# Patient Record
Sex: MALE
[De-identification: names, ages, dates, MRNs, and addresses within clinical notes are randomized per-mention and may not be internally consistent; named-entity substitution may affect disease eponyms.]

---

## 2022-05-05 ENCOUNTER — HOSPITAL ENCOUNTER (INPATIENT)
Dept: HOSPITAL 12 - ER | Age: 47
LOS: 4 days | Discharge: HOME | DRG: 645 | End: 2022-05-09
Payer: COMMERCIAL

## 2022-05-05 VITALS — BODY MASS INDEX: 35 KG/M2 | HEIGHT: 71 IN | WEIGHT: 250 LBS

## 2022-05-05 DIAGNOSIS — E66.01: ICD-10-CM

## 2022-05-05 DIAGNOSIS — E83.42: ICD-10-CM

## 2022-05-05 DIAGNOSIS — Z20.822: ICD-10-CM

## 2022-05-05 DIAGNOSIS — T38.895A: ICD-10-CM

## 2022-05-05 DIAGNOSIS — E23.2: ICD-10-CM

## 2022-05-05 DIAGNOSIS — E11.9: ICD-10-CM

## 2022-05-05 DIAGNOSIS — G40.909: ICD-10-CM
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
## 2022-05-05 DIAGNOSIS — E22.2: Primary | ICD-10-CM

## 2022-05-05 LAB
ALP SERPL-CCNC: 69 U/L (ref 50–136)
ALT SERPL W/O P-5'-P-CCNC: 31 U/L (ref 16–63)
AST SERPL-CCNC: 21 U/L (ref 15–37)
BILIRUB DIRECT SERPL-MCNC: 0.2 MG/DL (ref 0–0.2)
BILIRUB SERPL-MCNC: 0.7 MG/DL (ref 0.2–1)
BUN SERPL-MCNC: 10 MG/DL (ref 7–18)
CHLORIDE SERPL-SCNC: 84 MMOL/L (ref 98–107)
CO2 SERPL-SCNC: 25 MMOL/L (ref 21–32)
CREAT SERPL-MCNC: 0.9 MG/DL (ref 0.6–1.3)
GLUCOSE SERPL-MCNC: 95 MG/DL (ref 74–106)
HCT VFR BLD AUTO: 38.9 % (ref 36.7–47.1)
MCH RBC QN AUTO: 29 UUG (ref 23.8–33.4)
MCV RBC AUTO: 78.5 FL (ref 73–96.2)
PLATELET # BLD AUTO: 231 K/UL (ref 152–348)
POTASSIUM SERPL-SCNC: 4 MMOL/L (ref 3.5–5.1)
WS STN SPEC: 6.9 G/DL (ref 6.4–8.2)

## 2022-05-05 PROCEDURE — A4663 DIALYSIS BLOOD PRESSURE CUFF: HCPCS

## 2022-05-05 PROCEDURE — G0378 HOSPITAL OBSERVATION PER HR: HCPCS

## 2022-05-05 RX ADMIN — MAGNESIUM SULFATE IN DEXTROSE SCH MLS/HR: 10 INJECTION, SOLUTION INTRAVENOUS at 21:00

## 2022-05-05 RX ADMIN — MAGNESIUM SULFATE IN DEXTROSE SCH MLS/HR: 10 INJECTION, SOLUTION INTRAVENOUS at 21:27

## 2022-05-05 RX ADMIN — MAGNESIUM SULFATE IN DEXTROSE SCH MLS/HR: 10 INJECTION, SOLUTION INTRAVENOUS at 20:16

## 2022-05-05 RX ADMIN — MAGNESIUM SULFATE IN DEXTROSE SCH MLS/HR: 10 INJECTION, SOLUTION INTRAVENOUS at 20:42

## 2022-05-05 NOTE — NUR
Patient BIB [facility] c/o seizure x2 today. Patient has a hx of epilepsy. Last 
seizure episode was April 2019. Patient is A/Ox4, no SOB noted. Blood sugar was 
92 per report

## 2022-05-06 VITALS — SYSTOLIC BLOOD PRESSURE: 132 MMHG | DIASTOLIC BLOOD PRESSURE: 76 MMHG

## 2022-05-06 VITALS — DIASTOLIC BLOOD PRESSURE: 71 MMHG | SYSTOLIC BLOOD PRESSURE: 11 MMHG

## 2022-05-06 VITALS — DIASTOLIC BLOOD PRESSURE: 72 MMHG | SYSTOLIC BLOOD PRESSURE: 125 MMHG

## 2022-05-06 VITALS — DIASTOLIC BLOOD PRESSURE: 80 MMHG | SYSTOLIC BLOOD PRESSURE: 188 MMHG

## 2022-05-06 VITALS — SYSTOLIC BLOOD PRESSURE: 120 MMHG | DIASTOLIC BLOOD PRESSURE: 67 MMHG

## 2022-05-06 VITALS — DIASTOLIC BLOOD PRESSURE: 79 MMHG | SYSTOLIC BLOOD PRESSURE: 132 MMHG

## 2022-05-06 VITALS — DIASTOLIC BLOOD PRESSURE: 70 MMHG | SYSTOLIC BLOOD PRESSURE: 147 MMHG

## 2022-05-06 LAB
ALP SERPL-CCNC: 59 U/L (ref 50–136)
ALT SERPL W/O P-5'-P-CCNC: 42 U/L (ref 16–63)
AST SERPL-CCNC: 77 U/L (ref 15–37)
BILIRUB SERPL-MCNC: 0.7 MG/DL (ref 0.2–1)
BUN SERPL-MCNC: 10 MG/DL (ref 7–18)
BUN SERPL-MCNC: 10 MG/DL (ref 7–18)
CHLORIDE SERPL-SCNC: 87 MMOL/L (ref 98–107)
CHLORIDE SERPL-SCNC: 89 MMOL/L (ref 98–107)
CHLORIDE UR-SCNC: 121 MMOL/L (ref 100–250)
CHOLEST SERPL-MCNC: 126 MG/DL (ref ?–200)
CO2 SERPL-SCNC: 26 MMOL/L (ref 21–32)
CO2 SERPL-SCNC: 27 MMOL/L (ref 21–32)
CREAT SERPL-MCNC: 0.8 MG/DL (ref 0.6–1.3)
CREAT SERPL-MCNC: 0.9 MG/DL (ref 0.6–1.3)
GLUCOSE SERPL-MCNC: 87 MG/DL (ref 74–106)
GLUCOSE SERPL-MCNC: 93 MG/DL (ref 74–106)
HCT VFR BLD AUTO: 36.5 % (ref 36.7–47.1)
HDLC SERPL-MCNC: 52 MG/DL (ref 40–60)
MAGNESIUM SERPL-MCNC: 1.9 MG/DL (ref 1.8–2.4)
MCH RBC QN AUTO: 29.5 UUG (ref 23.8–33.4)
MCV RBC AUTO: 78.1 FL (ref 73–96.2)
PHOSPHATE SERPL-MCNC: 3.1 MG/DL (ref 2.5–4.9)
PLATELET # BLD AUTO: 223 K/UL (ref 152–348)
POTASSIUM SERPL-SCNC: 3.7 MMOL/L (ref 3.5–5.1)
POTASSIUM SERPL-SCNC: 3.8 MMOL/L (ref 3.5–5.1)
POTASSIUM UR-SCNC: 22 MMOL/L (ref 25–125)
TRIGL SERPL-MCNC: 72 MG/DL (ref 30–150)
TSH SERPL DL<=0.005 MIU/L-ACNC: 1.7 MIU/ML (ref 0.36–3.74)
WS STN SPEC: 6.2 G/DL (ref 6.4–8.2)

## 2022-05-06 RX ADMIN — ACETAMINOPHEN PRN MG: 325 TABLET ORAL at 08:26

## 2022-05-06 RX ADMIN — ACETAMINOPHEN PRN MG: 325 TABLET ORAL at 19:07

## 2022-05-06 RX ADMIN — ACETAMINOPHEN PRN MG: 325 TABLET ORAL at 00:43

## 2022-05-06 RX ADMIN — PANTOPRAZOLE SODIUM SCH MG: 40 TABLET, DELAYED RELEASE ORAL at 06:12

## 2022-05-06 NOTE — NUR
Received patient in bed with wife at bedside. AAOX4. Sinus Rhythm on tele with HR of 74bpm. 
IV access patent and intact running 3% at 35cc/hr. Seizure Precautions in placed. Will 
continue to monitor. 

-------------------------------------------------------------------------------

Addendum: 05/07/22 at 2104 by SHARON BARTHOLOMEW RN

-------------------------------------------------------------------------------

running 3% Nacl at 35cc/hr.

## 2022-05-06 NOTE — NUR
Patient's neurologist Dr. Massey had requested for a Levetiracetam level draw, since normal 
dose of Keppra was increased from 500mg to 1500mg. Dr. Avalos notified and agreed to put in 
the order.

## 2022-05-06 NOTE — NUR
neurologist informed of seizure episode with orders to adjust dose of Keppra. Will continue 
to monitor patient.

## 2022-05-06 NOTE — NUR
PATIENT ALERT AND ORIENTED X4. EATING DINNER. NOT IN DISTRESS.  TELE SR 75. SODIUM 3% AT 
35ML PER HOUR INFUSING. SEIZURE PRECAUTIONS REINFORCED.

## 2022-05-06 NOTE — NUR
PATIENT HAD SEIZURE AT 1535 THAT LASTED 35-45 SECONDS. LORAZEPAM 1 MG GIVEN IV PUSH. DR MAYRA GARCIA.

## 2022-05-07 VITALS — DIASTOLIC BLOOD PRESSURE: 72 MMHG | SYSTOLIC BLOOD PRESSURE: 129 MMHG

## 2022-05-07 VITALS — DIASTOLIC BLOOD PRESSURE: 85 MMHG | SYSTOLIC BLOOD PRESSURE: 133 MMHG

## 2022-05-07 VITALS — DIASTOLIC BLOOD PRESSURE: 68 MMHG | SYSTOLIC BLOOD PRESSURE: 115 MMHG

## 2022-05-07 VITALS — SYSTOLIC BLOOD PRESSURE: 135 MMHG | DIASTOLIC BLOOD PRESSURE: 69 MMHG

## 2022-05-07 VITALS — SYSTOLIC BLOOD PRESSURE: 123 MMHG | DIASTOLIC BLOOD PRESSURE: 73 MMHG

## 2022-05-07 LAB
BUN SERPL-MCNC: 8 MG/DL (ref 7–18)
CHLORIDE SERPL-SCNC: 89 MMOL/L (ref 98–107)
CO2 SERPL-SCNC: 28 MMOL/L (ref 21–32)
CREAT SERPL-MCNC: 0.9 MG/DL (ref 0.6–1.3)
GLUCOSE SERPL-MCNC: 88 MG/DL (ref 74–106)
HCT VFR BLD AUTO: 38 % (ref 36.7–47.1)
MAGNESIUM SERPL-MCNC: 1.8 MG/DL (ref 1.8–2.4)
MCH RBC QN AUTO: 29.2 UUG (ref 23.8–33.4)
MCV RBC AUTO: 79.8 FL (ref 73–96.2)
PHOSPHATE SERPL-MCNC: 2.1 MG/DL (ref 2.5–4.9)
PLATELET # BLD AUTO: 207 K/UL (ref 152–348)
POTASSIUM SERPL-SCNC: 4.4 MMOL/L (ref 3.5–5.1)
TSH SERPL DL<=0.005 MIU/L-ACNC: 1.98 MIU/ML (ref 0.36–3.74)
URATE SERPL-MCNC: 4.3 MG/DL (ref 3.5–7.2)

## 2022-05-07 RX ADMIN — PANTOPRAZOLE SODIUM SCH MG: 40 TABLET, DELAYED RELEASE ORAL at 06:26

## 2022-05-07 NOTE — NUR
NO SEIZURE ACTIVITIES AT THIS TIME REMAIN ON 3 PERCENT SALINE AS ORDERED PHOS LEVEL IS 2.1 
WITH NEUTROPHOS REPLACEMENT AS ORDERED.WILL CONTINUE TO OBSERVE.

## 2022-05-07 NOTE — NUR
Received patient in bed. AAOX4. When asked if he remembers that he had a seizure yesterday 
afternoon, patient stated that he doesn't remember. Currently SR on telemonitor. Patient 
denies dizziness, SOB or chest pain. IV access patent and intact running  40ml/hr over 5 
hours of 3% N.S. 200ml to be infused. Safety and seizure precautions in place. Will continue 
to monitor. 

-------------------------------------------------------------------------------

Addendum: 05/07/22 at 2104 by SHARON BARTHOLOMEW RN

-------------------------------------------------------------------------------

3% NaCl running 40ml/hr to run for 5 hours.

## 2022-05-07 NOTE — NUR
RECEIVED A CALL FRON DAVID NP STATED THAT DR SPENCER WANTS TO START PATIENT ON 3 
PERCENT NS FOR 5 HOURS AND NOTED.

## 2022-05-07 NOTE — NUR
Patient slept through the night. No seizures noted. Seizure precautions maintained. Sinus 
rhythm with episodes of sinus keyon on telemonitor. No acute distress noted at this time.  
Will endorse to day shift.

## 2022-05-08 VITALS — SYSTOLIC BLOOD PRESSURE: 141 MMHG | DIASTOLIC BLOOD PRESSURE: 81 MMHG

## 2022-05-08 VITALS — SYSTOLIC BLOOD PRESSURE: 138 MMHG | DIASTOLIC BLOOD PRESSURE: 75 MMHG

## 2022-05-08 VITALS — SYSTOLIC BLOOD PRESSURE: 122 MMHG | DIASTOLIC BLOOD PRESSURE: 56 MMHG

## 2022-05-08 VITALS — SYSTOLIC BLOOD PRESSURE: 116 MMHG | DIASTOLIC BLOOD PRESSURE: 56 MMHG

## 2022-05-08 VITALS — DIASTOLIC BLOOD PRESSURE: 86 MMHG | SYSTOLIC BLOOD PRESSURE: 135 MMHG

## 2022-05-08 LAB
BUN SERPL-MCNC: 8 MG/DL (ref 7–18)
CHLORIDE SERPL-SCNC: 88 MMOL/L (ref 98–107)
CO2 SERPL-SCNC: 29 MMOL/L (ref 21–32)
CREAT SERPL-MCNC: 1 MG/DL (ref 0.6–1.3)
GLUCOSE SERPL-MCNC: 100 MG/DL (ref 74–106)
HCT VFR BLD AUTO: 35.9 % (ref 36.7–47.1)
MAGNESIUM SERPL-MCNC: 1.6 MG/DL (ref 1.8–2.4)
MCH RBC QN AUTO: 29 UUG (ref 23.8–33.4)
MCV RBC AUTO: 78.4 FL (ref 73–96.2)
PHOSPHATE SERPL-MCNC: 2.6 MG/DL (ref 2.5–4.9)
PLATELET # BLD AUTO: 198 K/UL (ref 152–348)
POTASSIUM SERPL-SCNC: 4.4 MMOL/L (ref 3.5–5.1)

## 2022-05-08 RX ADMIN — PANTOPRAZOLE SODIUM SCH MG: 40 TABLET, DELAYED RELEASE ORAL at 06:11

## 2022-05-08 RX ADMIN — Medication SCH MG: at 17:33

## 2022-05-08 RX ADMIN — FUROSEMIDE SCH MG: 10 INJECTION, SOLUTION INTRAMUSCULAR; INTRAVENOUS at 13:35

## 2022-05-08 RX ADMIN — Medication SCH MG: at 13:35

## 2022-05-08 NOTE — NUR
Patient slept through the night, with no seizure activity noted. Sinus rhythm - sinus 
bradycardia on telemonitor with HR of 56bpm. No acute distress noted at this time. Advised 
patient to collect midstream urine for lab urine osmolality. Safety and seizure precautions 
maintained. Will endorse to day.

## 2022-05-08 NOTE — NUR
Received patient in bed. AAOX4. No acute distress noted at this time. Safety and Seizure 
precautions in place. Will continue to monitor.

## 2022-05-08 NOTE — NUR
DR DE LA ROSAISH HERE TO SEE PATIENT AND IS TALKING WITH PATIENT AND HIS WIFE ON THE PHONE NO 
NEW ORDERS AT THIS TIME.

## 2022-05-08 NOTE — NUR
RECEIVED PATIENT IN BED AWAKE ALERT AND ORIENTED DENIES PAIN OR DISCOMFORTS AT THIS TIME NO 
SEIZURE ACTIVITIES NOTED AT THIS TIME SEIZURE PRECAUTIONS OBSERVED CALL LIGHTS AND PERSONAL 
BELONGINGS ARE WITHIN EASY REACH WILL CONTINUE TO OBSERVE.

## 2022-05-09 VITALS — SYSTOLIC BLOOD PRESSURE: 119 MMHG | DIASTOLIC BLOOD PRESSURE: 70 MMHG

## 2022-05-09 VITALS — SYSTOLIC BLOOD PRESSURE: 139 MMHG | DIASTOLIC BLOOD PRESSURE: 76 MMHG

## 2022-05-09 VITALS — SYSTOLIC BLOOD PRESSURE: 129 MMHG | DIASTOLIC BLOOD PRESSURE: 68 MMHG

## 2022-05-09 LAB
BUN SERPL-MCNC: 7 MG/DL (ref 7–18)
CHLORIDE SERPL-SCNC: 92 MMOL/L (ref 98–107)
CO2 SERPL-SCNC: 32 MMOL/L (ref 21–32)
CREAT SERPL-MCNC: 1 MG/DL (ref 0.6–1.3)
GLUCOSE SERPL-MCNC: 97 MG/DL (ref 74–106)
HCT VFR BLD AUTO: 38.2 % (ref 36.7–47.1)
MAGNESIUM SERPL-MCNC: 1.8 MG/DL (ref 1.8–2.4)
MCH RBC QN AUTO: 29.4 UUG (ref 23.8–33.4)
MCV RBC AUTO: 78.7 FL (ref 73–96.2)
PHOSPHATE SERPL-MCNC: 3.6 MG/DL (ref 2.5–4.9)
PLATELET # BLD AUTO: 216 K/UL (ref 152–348)
POTASSIUM SERPL-SCNC: 4.9 MMOL/L (ref 3.5–5.1)

## 2022-05-09 RX ADMIN — PANTOPRAZOLE SODIUM SCH MG: 40 TABLET, DELAYED RELEASE ORAL at 06:26

## 2022-05-09 RX ADMIN — Medication SCH MG: at 08:43

## 2022-05-09 RX ADMIN — Medication SCH MG: at 12:47

## 2022-05-09 RX ADMIN — FUROSEMIDE SCH MG: 10 INJECTION, SOLUTION INTRAMUSCULAR; INTRAVENOUS at 08:44

## 2022-05-09 NOTE — NUR
PATIENT DISCHARGED PICKED UP BY HIS WIFE JANEL IN SATISFACTORY CONDITION WITH DISCHARGE 
INSTRUCTIONS SUCH AS TO DECREASE KEPPRA TO 1000 MG UNTIL SEEN BY THE NEUROLOGIST THIS WEEK 
ALSO NO STRENOUS EXERCISES UNTIL SEEN BY THE ENDOCRINOLOGIST ALSO TO STOP TAKING THE 
DESMOPRESSIN AND HAVE THE ENDOCRINOLOGIST TO DECIDE AND PATIENT AND HIS WIFE EXPRESSED 
UNDERSTAND ASSISTED OUT ON THE W/CHAIR TO HIS WIFES CAR WITH ALL HIS PERSONAL BELONGINGS.

## 2022-05-09 NOTE — NUR
PATIENT SEEN AND EXAMINED BY DAVID SHAY WITH DISCHARGE PLANNING AT THIS TIME PATIENTS WIFE IS 
AT THE BEDSIDE.

## 2022-05-09 NOTE — NUR
Patient slept intermittently through the night, with frequent visits to the restroom. No 
seizures noted. No acute distress noted at this time. Safety and seizure precautions 
maintained.

## 2022-05-09 NOTE — NUR
DR SPENCER HERE TO SEE PATIENT AND I GAVE HIM PATIENTS WIFE PHONE NUMBER TO CALL AND 
INFORM HER THE PLAN OF CARE NO NEW ORDERS AT THIS TIME PATIENT IS AWAKE ALERT AND ORIENTED 
DENIES DISCOMFORTS NO SEIZURE ACTIVITIES AT THIS TIME CALL LIGHTS AND PERSONAL BELONGINGS 
ARE WITHIN EASY REACH WILL CONTINUE TO OBSERVE.